# Patient Record
Sex: FEMALE | Race: WHITE | NOT HISPANIC OR LATINO | Employment: FULL TIME | URBAN - METROPOLITAN AREA
[De-identification: names, ages, dates, MRNs, and addresses within clinical notes are randomized per-mention and may not be internally consistent; named-entity substitution may affect disease eponyms.]

---

## 2023-12-18 ENCOUNTER — OFFICE VISIT (OUTPATIENT)
Dept: PRIMARY CARE | Facility: CLINIC | Age: 53
End: 2023-12-18
Payer: COMMERCIAL

## 2023-12-18 VITALS
BODY MASS INDEX: 28.84 KG/M2 | HEART RATE: 64 BPM | WEIGHT: 176 LBS | DIASTOLIC BLOOD PRESSURE: 78 MMHG | TEMPERATURE: 98.1 F | OXYGEN SATURATION: 96 % | SYSTOLIC BLOOD PRESSURE: 112 MMHG

## 2023-12-18 DIAGNOSIS — J01.40 ACUTE NON-RECURRENT PANSINUSITIS: Primary | ICD-10-CM

## 2023-12-18 DIAGNOSIS — J20.8 ACUTE BRONCHITIS DUE TO OTHER SPECIFIED ORGANISMS: ICD-10-CM

## 2023-12-18 PROCEDURE — 99213 OFFICE O/P EST LOW 20 MIN: CPT | Performed by: NURSE PRACTITIONER

## 2023-12-18 PROCEDURE — 1036F TOBACCO NON-USER: CPT | Performed by: NURSE PRACTITIONER

## 2023-12-18 RX ORDER — ALBUTEROL SULFATE 90 UG/1
2 AEROSOL, METERED RESPIRATORY (INHALATION) EVERY 6 HOURS PRN
Qty: 8 G | Refills: 5 | Status: SHIPPED | OUTPATIENT
Start: 2023-12-18 | End: 2024-12-17

## 2023-12-18 RX ORDER — ESCITALOPRAM OXALATE 10 MG/1
10 TABLET ORAL DAILY
COMMUNITY
Start: 2023-01-12

## 2023-12-18 RX ORDER — CETIRIZINE HYDROCHLORIDE 10 MG/1
10 TABLET ORAL DAILY
COMMUNITY
Start: 2018-09-11

## 2023-12-18 RX ORDER — ESOMEPRAZOLE MAGNESIUM 40 MG/1
40 CAPSULE, DELAYED RELEASE ORAL
COMMUNITY
End: 2024-03-01

## 2023-12-18 RX ORDER — AMOXICILLIN AND CLAVULANATE POTASSIUM 875; 125 MG/1; MG/1
875 TABLET, FILM COATED ORAL 2 TIMES DAILY
Qty: 20 TABLET | Refills: 0 | Status: SHIPPED | OUTPATIENT
Start: 2023-12-18 | End: 2024-01-04 | Stop reason: ALTCHOICE

## 2023-12-18 RX ORDER — BENZONATATE 200 MG/1
200 CAPSULE ORAL 3 TIMES DAILY PRN
Qty: 21 CAPSULE | Refills: 0 | Status: SHIPPED | OUTPATIENT
Start: 2023-12-18 | End: 2023-12-25

## 2023-12-18 ASSESSMENT — ENCOUNTER SYMPTOMS
SINUS PRESSURE: 1
SINUS PAIN: 1
DIARRHEA: 1
DIZZINESS: 0
FATIGUE: 1
COUGH: 1
VOMITING: 1
CHILLS: 1
RHINORRHEA: 0
SORE THROAT: 0
HEADACHES: 1
NAUSEA: 1
FEVER: 0

## 2023-12-18 NOTE — PROGRESS NOTES
Subjective   Patient ID: Halle Chen is a 53 y.o. female who presents for Cough (Congestion, fatigue, sinus pressure, and headache. X2 weeks.).    HPI   Halle is a 53-year-old female who complains of chest congestion, cough, sinus pressure and frontal headache that have been ongoing for at least 2 weeks.  See review of systems  No contacts ill  Using dayquil As needed  Had Covid 19 mid 11/23    Review of Systems   Constitutional:  Positive for chills and fatigue. Negative for fever.   HENT:  Positive for congestion, sinus pressure and sinus pain. Negative for ear pain, rhinorrhea, sneezing and sore throat.    Respiratory:  Positive for cough (occasional sputum).    Cardiovascular:  Negative for chest pain.   Gastrointestinal:  Positive for diarrhea, nausea and vomiting.        Sx this past weekend but now resolved   Neurological:  Positive for headaches. Negative for dizziness.       Objective   /78   Pulse 64   Temp 36.7 °C (98.1 °F)   Wt 79.8 kg (176 lb)   SpO2 96%   BMI 28.84 kg/m²     Physical Exam  Alert and oriented x 3, no acute distress  Bilateral TMs dull.  Nares red with edematous turbinates.  There is posterior pharynx erythema.  Maxillary and frontal sinus tenderness noted.  Neck supple without lymphadenopathy  Lungs coarse with harsh cough on forced expiration.  No wheezing or rales.  Speaks complete sentences.  Heart with regular rate and rhythm  Skin warm and dry without rash  Neuro grossly intact    Assessment/Plan   Diagnoses and all orders for this visit:  Acute non-recurrent pansinusitis  -     amoxicillin-pot clavulanate (Augmentin) 875-125 mg tablet; Take 1 tablet (875 mg) by mouth 2 times a day for 10 days.  -     albuterol (Ventolin HFA) 90 mcg/actuation inhaler; Inhale 2 puffs every 6 hours if needed for wheezing or shortness of breath (cough).  -     benzonatate (Tessalon) 200 mg capsule; Take 1 capsule (200 mg) by mouth 3 times a day as needed for cough for up to 7  days. Do not crush or chew.  Acute bronchitis due to other specified organisms  -     amoxicillin-pot clavulanate (Augmentin) 875-125 mg tablet; Take 1 tablet (875 mg) by mouth 2 times a day for 10 days.  -     albuterol (Ventolin HFA) 90 mcg/actuation inhaler; Inhale 2 puffs every 6 hours if needed for wheezing or shortness of breath (cough).  -     benzonatate (Tessalon) 200 mg capsule; Take 1 capsule (200 mg) by mouth 3 times a day as needed for cough for up to 7 days. Do not crush or chew.  Needs better control  Concern for secondary bacterial infection after recent COVID-19  Will cover with medications as prescribed  OTC as directed  Fluids, rest  Follow-up in 10 to 14 days if not improving, sooner if worse.  Would need chest x-ray if not improving

## 2024-01-03 ENCOUNTER — LAB (OUTPATIENT)
Dept: LAB | Facility: LAB | Age: 54
End: 2024-01-03
Payer: COMMERCIAL

## 2024-01-03 DIAGNOSIS — E78.00 PURE HYPERCHOLESTEROLEMIA, UNSPECIFIED: ICD-10-CM

## 2024-01-03 DIAGNOSIS — Z00.00 ENCOUNTER FOR GENERAL ADULT MEDICAL EXAMINATION WITHOUT ABNORMAL FINDINGS: Primary | ICD-10-CM

## 2024-01-03 LAB
ALBUMIN SERPL-MCNC: 4.6 G/DL (ref 3.5–5)
ALP BLD-CCNC: 125 U/L (ref 35–125)
ALT SERPL-CCNC: 51 U/L (ref 5–40)
ANION GAP SERPL CALC-SCNC: 12 MMOL/L
AST SERPL-CCNC: 38 U/L (ref 5–40)
BASOPHILS # BLD AUTO: 0.07 X10*3/UL (ref 0–0.1)
BASOPHILS NFR BLD AUTO: 1.3 %
BILIRUB SERPL-MCNC: 1.6 MG/DL (ref 0.1–1.2)
BUN SERPL-MCNC: 14 MG/DL (ref 8–25)
CALCIUM SERPL-MCNC: 9.3 MG/DL (ref 8.5–10.4)
CHLORIDE SERPL-SCNC: 102 MMOL/L (ref 97–107)
CHOLEST SERPL-MCNC: 233 MG/DL (ref 133–200)
CHOLEST/HDLC SERPL: 4.5 {RATIO}
CO2 SERPL-SCNC: 27 MMOL/L (ref 24–31)
CREAT SERPL-MCNC: 0.8 MG/DL (ref 0.4–1.6)
EOSINOPHIL # BLD AUTO: 0.37 X10*3/UL (ref 0–0.7)
EOSINOPHIL NFR BLD AUTO: 6.7 %
ERYTHROCYTE [DISTWIDTH] IN BLOOD BY AUTOMATED COUNT: 11.9 % (ref 11.5–14.5)
GFR SERPL CREATININE-BSD FRML MDRD: 88 ML/MIN/1.73M*2
GLUCOSE SERPL-MCNC: 97 MG/DL (ref 65–99)
HCT VFR BLD AUTO: 42.9 % (ref 36–46)
HDLC SERPL-MCNC: 52 MG/DL
HGB BLD-MCNC: 14.2 G/DL (ref 12–16)
IMM GRANULOCYTES # BLD AUTO: 0.02 X10*3/UL (ref 0–0.7)
IMM GRANULOCYTES NFR BLD AUTO: 0.4 % (ref 0–0.9)
LDLC SERPL CALC-MCNC: 127 MG/DL (ref 65–130)
LYMPHOCYTES # BLD AUTO: 1.46 X10*3/UL (ref 1.2–4.8)
LYMPHOCYTES NFR BLD AUTO: 26.3 %
MCH RBC QN AUTO: 32.1 PG (ref 26–34)
MCHC RBC AUTO-ENTMCNC: 33.1 G/DL (ref 32–36)
MCV RBC AUTO: 97 FL (ref 80–100)
MONOCYTES # BLD AUTO: 0.38 X10*3/UL (ref 0.1–1)
MONOCYTES NFR BLD AUTO: 6.8 %
NEUTROPHILS # BLD AUTO: 3.26 X10*3/UL (ref 1.2–7.7)
NEUTROPHILS NFR BLD AUTO: 58.5 %
NRBC BLD-RTO: 0 /100 WBCS (ref 0–0)
PLATELET # BLD AUTO: 196 X10*3/UL (ref 150–450)
POTASSIUM SERPL-SCNC: 4.7 MMOL/L (ref 3.4–5.1)
PROT SERPL-MCNC: 6.6 G/DL (ref 5.9–7.9)
RBC # BLD AUTO: 4.43 X10*6/UL (ref 4–5.2)
SODIUM SERPL-SCNC: 141 MMOL/L (ref 133–145)
TRIGL SERPL-MCNC: 272 MG/DL (ref 40–150)
WBC # BLD AUTO: 5.6 X10*3/UL (ref 4.4–11.3)

## 2024-01-03 PROCEDURE — 36415 COLL VENOUS BLD VENIPUNCTURE: CPT

## 2024-01-03 PROCEDURE — 80053 COMPREHEN METABOLIC PANEL: CPT

## 2024-01-03 PROCEDURE — 85025 COMPLETE CBC W/AUTO DIFF WBC: CPT

## 2024-01-03 PROCEDURE — 81003 URINALYSIS AUTO W/O SCOPE: CPT

## 2024-01-03 PROCEDURE — 80061 LIPID PANEL: CPT

## 2024-01-03 ASSESSMENT — PROMIS GLOBAL HEALTH SCALE
EMOTIONAL_PROBLEMS: RARELY
RATE_AVERAGE_PAIN: 0
RATE_SOCIAL_SATISFACTION: EXCELLENT
RATE_AVERAGE_FATIGUE: MILD
RATE_PHYSICAL_HEALTH: VERY GOOD
CARRYOUT_SOCIAL_ACTIVITIES: EXCELLENT
CARRYOUT_PHYSICAL_ACTIVITIES: COMPLETELY
RATE_QUALITY_OF_LIFE: EXCELLENT
RATE_GENERAL_HEALTH: VERY GOOD
RATE_MENTAL_HEALTH: VERY GOOD

## 2024-01-04 ENCOUNTER — OFFICE VISIT (OUTPATIENT)
Dept: PRIMARY CARE | Facility: CLINIC | Age: 54
End: 2024-01-04
Payer: COMMERCIAL

## 2024-01-04 ENCOUNTER — TELEPHONE (OUTPATIENT)
Dept: PRIMARY CARE | Facility: CLINIC | Age: 54
End: 2024-01-04

## 2024-01-04 VITALS
RESPIRATION RATE: 16 BRPM | BODY MASS INDEX: 27.48 KG/M2 | OXYGEN SATURATION: 95 % | WEIGHT: 171 LBS | HEART RATE: 71 BPM | SYSTOLIC BLOOD PRESSURE: 110 MMHG | HEIGHT: 66 IN | DIASTOLIC BLOOD PRESSURE: 82 MMHG

## 2024-01-04 DIAGNOSIS — Z00.00 WELL ADULT EXAM: ICD-10-CM

## 2024-01-04 DIAGNOSIS — K21.9 GASTROESOPHAGEAL REFLUX DISEASE WITHOUT ESOPHAGITIS: ICD-10-CM

## 2024-01-04 DIAGNOSIS — E78.00 PURE HYPERCHOLESTEROLEMIA: ICD-10-CM

## 2024-01-04 DIAGNOSIS — Z00.00 WELL ADULT EXAM: Primary | ICD-10-CM

## 2024-01-04 DIAGNOSIS — F41.9 ANXIETY DISORDER, UNSPECIFIED TYPE: ICD-10-CM

## 2024-01-04 LAB
APPEARANCE UR: ABNORMAL
BILIRUB UR STRIP.AUTO-MCNC: NEGATIVE MG/DL
COLOR UR: ABNORMAL
GLUCOSE UR STRIP.AUTO-MCNC: NORMAL MG/DL
KETONES UR STRIP.AUTO-MCNC: NEGATIVE MG/DL
LEUKOCYTE ESTERASE UR QL STRIP.AUTO: NEGATIVE
NITRITE UR QL STRIP.AUTO: NEGATIVE
PH UR STRIP.AUTO: 6 [PH]
PROT UR STRIP.AUTO-MCNC: NEGATIVE MG/DL
RBC # UR STRIP.AUTO: NEGATIVE /UL
SP GR UR STRIP.AUTO: 1.01
UROBILINOGEN UR STRIP.AUTO-MCNC: NORMAL MG/DL

## 2024-01-04 PROCEDURE — 1036F TOBACCO NON-USER: CPT | Performed by: FAMILY MEDICINE

## 2024-01-04 PROCEDURE — 99212 OFFICE O/P EST SF 10 MIN: CPT | Performed by: FAMILY MEDICINE

## 2024-01-04 PROCEDURE — 99396 PREV VISIT EST AGE 40-64: CPT | Performed by: FAMILY MEDICINE

## 2024-01-04 RX ORDER — IPRATROPIUM BROMIDE 42 UG/1
2 SPRAY, METERED NASAL 4 TIMES DAILY
COMMUNITY
Start: 2022-03-30

## 2024-01-04 RX ORDER — CHOLECALCIFEROL (VITAMIN D3) 50 MCG
2000 TABLET ORAL
COMMUNITY
Start: 2022-03-28

## 2024-01-04 ASSESSMENT — PATIENT HEALTH QUESTIONNAIRE - PHQ9
2. FEELING DOWN, DEPRESSED OR HOPELESS: NOT AT ALL
SUM OF ALL RESPONSES TO PHQ9 QUESTIONS 1 AND 2: 0
1. LITTLE INTEREST OR PLEASURE IN DOING THINGS: NOT AT ALL

## 2024-01-04 ASSESSMENT — ENCOUNTER SYMPTOMS
OCCASIONAL FEELINGS OF UNSTEADINESS: 0
DEPRESSION: 0
LOSS OF SENSATION IN FEET: 0

## 2024-01-04 ASSESSMENT — PAIN SCALES - GENERAL: PAINLEVEL: 0-NO PAIN

## 2024-01-04 NOTE — ASSESSMENT & PLAN NOTE
Will wean her off the Lexapro by taking half a pill per 5 mg every day for 1 month.  If she is doing well at that time she is to cut back to half a pill every other day.  She is to do this for a month and if all is well stop altogether.  She should call here for any problems.

## 2024-01-04 NOTE — PROGRESS NOTES
Subjective   Patient ID: Halle Chen is a 53 y.o. female who presents for Annual Exam.    HPI   1. HALLE is seen for for her comprehensive physical exam. PMH, PSH, family history and social history were reviewed and updated.  Colonoscopy in 2022 by Dr. Baker. Her father had colon CA and she has siblings with precancerous polyps and colitis.   GYN history -.  Sees a Gynecologist.     2. HALLE is seen also for follow up of anxiety.  She is on Lexapro 10 mg. She was followed by a psychiatrist but is no longer.  She is interested in getting off the Lexapro that she has been having no anxiety symptoms for many years.     3. HALLE is seen for also for follow up for GERD.  She is on Nexium daily which controls her SX.     4. She is here for hypercholesterolemia.  She is on no medication.  Recent LDL is 127.     5. She is also here for elevated LFTs which were noted on this year's CPE blood work.  She has Gilbert's  disease.    Review of Systems  Constitutional symptoms: No fever, loss of appetite, headaches, fatigue.  Eyes: Negative for vision loss or blurred or double vision.  ENT: Negative for hearing loss, tinnitus, nasal congestion, rhinorrhea, nosebleeds, teeth problems, mouth sores, sore throat or dysphagia.  Cardiovascular: Negative for chest pain/pressure, palpitations, edema, claudication.  Respiratory: Negative for shortness of breath, dyspnea on exertion, pain with breathing or coughing.  Breast: Negative for tenderness, masses, gynecomastia or discharge.  Gastrointestinal: Negative for anorexia, nausea, vomiting, indigestion, pain, change in bowel habits or blood in stool.  Musculoskeletal: Negative for joint pain, joint swelling, myalgias or cramps.  Skin: Negative for rash, changing skin lesions.  Neurological: Negative for headache, numbness, tingling, weakness or tremors.  Psychiatric: Negative for depression or anxiety.  Endocrine: Negative for marked change in weight, heat or  "cold intolerance, polyuria, polydipsia or polyphagia.  Hematologic: Negative for bruising or abnormal bleeding.    Objective   /82   Pulse 71   Resp 16   Ht 1.676 m (5' 6\")   Wt 77.6 kg (171 lb)   SpO2 95%   BMI 27.60 kg/m²     Physical Exam  General Appearance: ALVARO is in no acute distress. She is well nourished, well developed. The patient is awake and alert and appears stated age. ALVARO is cooperative with exam.  Head:   ALVARO's hair pattern is normal for patients age and The scalp is normal . The skull is normocephalic, atraumatic. The face is unremarkable with no facial droop.  Eyes: PERRLA, EOMI, no scleral icterus. Ophthalmoscopic exam of shows normal cup to disk ratio reveals normal light reflex.  Ears, Nose, Mouth, Throat: Both canals are normal.  Tympanic membranes are pearly gray, normal landmarks, good light reflex.   NOSE: Nasal mucosa is normal with no polyps, ulcers or lesions in Septum has no deviation. Tonsils are normal with no lesion. Uvula is normal. Posterior pharynx without lesions.  Neck: Inspection of the neck reveals no enlarged nodes, no masses, no JVD, euthyroid and symmetric, trachea is midline, no mass or JVD . Palpation shows normal pulsation, no adenopathy, or mass.   Carotid auscultation reveals No murmur appreciated with No carotid bruit present.  Chest: Lungs are clear to auscultation and percussion, no respiratory distress.  Cardiovascular: Heart is regularr in rhythm. No murmurs or gallops DP pulses are present. PT pulses are present Extremities: Trace edema of lower extremities.  No calf tenderness. Negative Jorge's sign.  Abdomen: Bowel sounds normal, non-tender no mass or organomeglia, no bruit. Auscultation: No tenderness, hepatosplenomegaly, or mass detection.. Light palpation reveals no tenderness, hepatosplenomegaly or mass detection..  Genitourinary: Genital/Rectal exam is deferred.  Musculoskeletal: Extremities: No deformity. No cyanosis, clubbing or " edema. Right lower extremity is normal on inspection. Right hip has no deformity. Palpation reveals no tenderness and Hip range of motion is full ROM without pain .  Both feet have no evidence of lesions and nails are in good repair.  Neurological: Awake, alert and oriented X3. Cranial nerves II - XII are grossly intact.  Assessment/Plan   Problem List Items Addressed This Visit             ICD-10-CM       High    Anxiety disorder F41.9     Will wean her off the Lexapro by taking half a pill per 5 mg every day for 1 month.  If she is doing well at that time she is to cut back to half a pill every other day.  She is to do this for a month and if all is well stop altogether.  She should call here for any problems.         Gastroesophageal reflux disease without esophagitis K21.9     Continue Nexium.         Pure hypercholesterolemia E78.00     Keep off medication.  Improve low-fat diet.  Increase exercise.  Will check again in 1 year at CPE.         Well adult exam - Primary Z00.00     Anticipatory guidance given.

## 2024-01-04 NOTE — ASSESSMENT & PLAN NOTE
Keep off medication.  Improve low-fat diet.  Increase exercise.  Will check again in 1 year at CPE.

## 2024-03-01 DIAGNOSIS — K21.9 GASTROESOPHAGEAL REFLUX DISEASE WITHOUT ESOPHAGITIS: ICD-10-CM

## 2024-03-01 RX ORDER — ESOMEPRAZOLE MAGNESIUM 40 MG/1
40 CAPSULE, DELAYED RELEASE ORAL DAILY
Qty: 90 CAPSULE | Refills: 1 | Status: SHIPPED | OUTPATIENT
Start: 2024-03-01

## 2024-04-05 ENCOUNTER — OFFICE VISIT (OUTPATIENT)
Dept: PRIMARY CARE | Facility: CLINIC | Age: 54
End: 2024-04-05
Payer: COMMERCIAL

## 2024-04-05 VITALS
SYSTOLIC BLOOD PRESSURE: 122 MMHG | DIASTOLIC BLOOD PRESSURE: 84 MMHG | BODY MASS INDEX: 27.76 KG/M2 | HEART RATE: 54 BPM | TEMPERATURE: 97.1 F | OXYGEN SATURATION: 97 % | WEIGHT: 172 LBS | RESPIRATION RATE: 16 BRPM

## 2024-04-05 DIAGNOSIS — S06.0X1A CONCUSSION WITH LOSS OF CONSCIOUSNESS OF 30 MINUTES OR LESS, INITIAL ENCOUNTER: Primary | ICD-10-CM

## 2024-04-05 PROCEDURE — 99214 OFFICE O/P EST MOD 30 MIN: CPT | Performed by: FAMILY MEDICINE

## 2024-04-05 PROCEDURE — 1036F TOBACCO NON-USER: CPT | Performed by: FAMILY MEDICINE

## 2024-04-05 ASSESSMENT — ENCOUNTER SYMPTOMS
LOSS OF SENSATION IN FEET: 0
OCCASIONAL FEELINGS OF UNSTEADINESS: 0
DEPRESSION: 0

## 2024-04-05 ASSESSMENT — PAIN SCALES - GENERAL: PAINLEVEL: 5

## 2024-04-05 NOTE — PROGRESS NOTES
Subjective   Patient ID: Halle Chen is a 53 y.o. female who presents for Head Injury (Black eye x 6 days).    HPI   She is here for head injury and possible concussion.  6 days ago she was sick with a stomach virus and was vomiting.  This went through the household.  At 1 point she felt sick and was going into the bathroom and started vomiting but she passed out.  When she woke up she had vomited on the floor and she had hit her right forehead as there was a large bump there and she had a headache.  She does not know if she passed out complete before she hit her head or she if she passed out because she hit her head.  She had a lot of bruising on top of the eye and then below the eye and subsequent days.  She started feeling a little foggy and has had a persistent headache so she came in to be assessed for concussion.  Her vomiting has resolved.  She denies any double vision no blurred vision.  The swelling above her eye resolved but now she is left with a bruise below her right eye. She has been eating fine and sleeping without a problem.  If she exerts herself she gets a little more of a headache.    Review of Systems  Denies blurred vision, chest pain, shortness of breath, nausea or vomiting, change in bowel habits or leg pain or swelling.    Objective   /84 (BP Location: Left arm, Patient Position: Sitting, BP Cuff Size: Large adult)   Pulse 54   Temp 36.2 °C (97.1 °F)   Resp 16   Wt 78 kg (172 lb)   LMP  (LMP Unknown)   SpO2 97%   BMI 27.76 kg/m²     Physical Exam  Vitals and nurse's notes reviewed  General: no acute distress  HEENT:Ecchymotic area below her right eye with some swelling.  There is some tenderness on the frontal bone above the right eye and some tenderness although mild on the zygomatic arch where the bruising is below her right eye.  EOMI x 2.  On palpating around the eye I feel no step-off or crepitus either above or below the eye.  Neck: Supple  Lungs: Clear  Cardio:  RRR w/o murmur  Abdomen: Soft, nontender, no hepatosplenomegaly  Extremities: No edema, no calf tenderness  Neuro: Alert and oriented with no focal deficits      Assessment/Plan   Problem List Items Addressed This Visit             ICD-10-CM       Medium    Concussion with loss of consciousness of 30 minutes or less - Primary S06.0X1A     It is unclear whether she passed out because of the concussion or prior to the concussion.  At any rate she has  symptoms that are consistent with concussion.  Overall her symptoms are improving.  There is no evidence of fracture on her examination and since her symptoms are improving and she has a normal exam we will hold off on any imaging.  I recommend she not be in any position where she could fall and injure her head at least for the next week or so.  I told her if she were to exercise she should do it at a very limited basis and modify her workouts so that there is no threat to  hitting her head.  If she has any worsening headache while she exercises she should stop.  She can use ibuprofen for pain.  Heat 20 min.  per application to the bruising.  If her symptoms should get worse or she develops any double vision or vomiting or worsening headache she should call back.

## 2024-04-05 NOTE — ASSESSMENT & PLAN NOTE
It is unclear whether she passed out because of the concussion or prior to the concussion.  At any rate she has  symptoms that are consistent with concussion.  Overall her symptoms are improving.  There is no evidence of fracture on her examination and since her symptoms are improving and she has a normal exam we will hold off on any imaging.  I recommend she not be in any position where she could fall and injure her head at least for the next week or so.  I told her if she were to exercise she should do it at a very limited basis and modify her workouts so that there is no threat to  hitting her head.  If she has any worsening headache while she exercises she should stop.  She can use ibuprofen for pain.  Heat 20 min.  per application to the bruising.  If her symptoms should get worse or she develops any double vision or vomiting or worsening headache she should call back.

## 2024-07-16 DIAGNOSIS — F41.9 ANXIETY DISORDER, UNSPECIFIED TYPE: Primary | ICD-10-CM

## 2024-07-16 RX ORDER — ALPRAZOLAM 0.25 MG/1
0.25 TABLET ORAL DAILY PRN
Qty: 10 TABLET | Refills: 0 | Status: SHIPPED | OUTPATIENT
Start: 2024-07-16 | End: 2024-07-26

## 2024-08-12 ENCOUNTER — OFFICE VISIT (OUTPATIENT)
Dept: PRIMARY CARE | Facility: CLINIC | Age: 54
End: 2024-08-12
Payer: COMMERCIAL

## 2024-08-12 VITALS
WEIGHT: 161 LBS | DIASTOLIC BLOOD PRESSURE: 74 MMHG | OXYGEN SATURATION: 96 % | RESPIRATION RATE: 16 BRPM | BODY MASS INDEX: 25.99 KG/M2 | SYSTOLIC BLOOD PRESSURE: 112 MMHG | HEART RATE: 64 BPM | TEMPERATURE: 98.1 F

## 2024-08-12 DIAGNOSIS — F41.9 ANXIETY DISORDER, UNSPECIFIED TYPE: ICD-10-CM

## 2024-08-12 DIAGNOSIS — R19.7 DIARRHEA OF PRESUMED INFECTIOUS ORIGIN: Primary | ICD-10-CM

## 2024-08-12 PROCEDURE — 99214 OFFICE O/P EST MOD 30 MIN: CPT | Performed by: FAMILY MEDICINE

## 2024-08-12 PROCEDURE — 1036F TOBACCO NON-USER: CPT | Performed by: FAMILY MEDICINE

## 2024-08-12 RX ORDER — ESCITALOPRAM OXALATE 10 MG/1
10 TABLET ORAL DAILY
Qty: 90 TABLET | Refills: 3 | Status: SHIPPED | OUTPATIENT
Start: 2024-08-12 | End: 2025-08-12

## 2024-08-12 ASSESSMENT — PATIENT HEALTH QUESTIONNAIRE - PHQ9
SUM OF ALL RESPONSES TO PHQ9 QUESTIONS 1 AND 2: 0
2. FEELING DOWN, DEPRESSED OR HOPELESS: NOT AT ALL
1. LITTLE INTEREST OR PLEASURE IN DOING THINGS: NOT AT ALL

## 2024-08-12 ASSESSMENT — ENCOUNTER SYMPTOMS
LOSS OF SENSATION IN FEET: 0
OCCASIONAL FEELINGS OF UNSTEADINESS: 0
DEPRESSION: 0

## 2024-08-12 ASSESSMENT — PAIN SCALES - GENERAL: PAINLEVEL: 0-NO PAIN

## 2024-08-12 NOTE — PROGRESS NOTES
Subjective   Patient ID: Halle Chen is a 54 y.o. female who presents for Abdominal Pain (Patient is here for stomach pain after she eats x 2 weeks).    HPI   She is here for abdominal discomfort and diarrhea for the past 2 weeks.  She is traveling in [place] in a day or so before she came home she developed a fever.  She started getting abdominal cramping and when she arrived home she started having episodes of diarrhea and reflux.  Her fever resolved but now every time she eats something she has to run to the bathroom has diarrhea 3-4 times.  She also feels like she is about to vomit but never does.  She can drink water without problems.  Her symptoms have persisted for the past 2 weeks.  When she takes Imodium it does slow down the diarrhea and the cramping.  She has a history of GERD and does take Nexium 40 daily which she has been taking.  She had a normal colonoscopy by Dr. Baker 2 years ago. She states her son also developed a fever on the trip but never had diarrhea he was, however, diagnosed with what sounds a good GI parasite prior to his trip to [place].  He is being treated for that now.    Review of Systems  Denies headache, blurred vision, chest pain, shortness of breath, or leg pain or swelling.    Objective   /74 (BP Location: Left arm, Patient Position: Sitting, BP Cuff Size: Large adult)   Pulse 64   Temp 36.7 °C (98.1 °F)   Resp 16   Wt 73 kg (161 lb)   LMP  (LMP Unknown)   SpO2 96%   BMI 25.99 kg/m²     Physical Exam  Vitals and nurse's notes reviewed  General: no acute distress  HEENT: Normal  Neck: Supple  Lungs: Clear  Cardio: RRR w/o murmur  Abdomen: Soft, Minimal epigastric tenderness, no hepatosplenomegaly  Extremities: No edema, no calf tenderness  Neuro: Alert and oriented with no focal deficits    Assessment/Plan   Problem List Items Addressed This Visit             ICD-10-CM       High    Anxiety disorder F41.9    Relevant Medications    escitalopram  (Lexapro) 10 mg tablet       Medium    Diarrhea of presumed infectious origin - Primary R19.7     Will get stool cultures.  Once he is are obtained she can go back to using Imodium on appearing basis.  She is increase fluids but avoid dairy products.  She is to take her Nexium twice daily for the next week.  I will notify her of culture results.  She is let me know if symptoms change.         Relevant Orders    Ova/Para + Giardia/Cryptosporidium Antigen    Rotavirus antigen, stool    Stool Pathogen Panel, PCR    C. difficile, PCR

## 2024-08-12 NOTE — ASSESSMENT & PLAN NOTE
Will get stool cultures.  Once he is are obtained she can go back to using Imodium on appearing basis.  She is increase fluids but avoid dairy products.  She is to take her Nexium twice daily for the next week.  I will notify her of culture results.  She is let me know if symptoms change.

## 2024-08-14 ENCOUNTER — LAB (OUTPATIENT)
Dept: LAB | Facility: LAB | Age: 54
End: 2024-08-14
Payer: COMMERCIAL

## 2024-08-14 DIAGNOSIS — R19.7 DIARRHEA OF PRESUMED INFECTIOUS ORIGIN: ICD-10-CM

## 2024-08-14 LAB — C DIF TOX TCDA+TCDB STL QL NAA+PROBE: NOT DETECTED

## 2024-08-14 PROCEDURE — 87493 C DIFF AMPLIFIED PROBE: CPT

## 2024-08-14 PROCEDURE — 87506 IADNA-DNA/RNA PROBE TQ 6-11: CPT

## 2024-08-14 PROCEDURE — 87425 ROTAVIRUS AG IA: CPT

## 2024-08-15 ENCOUNTER — TELEPHONE (OUTPATIENT)
Dept: PRIMARY CARE | Facility: CLINIC | Age: 54
End: 2024-08-15
Payer: COMMERCIAL

## 2024-08-15 DIAGNOSIS — R19.7 DIARRHEA OF PRESUMED INFECTIOUS ORIGIN: Primary | ICD-10-CM

## 2024-08-15 LAB
C COLI+JEJ+UPSA DNA STL QL NAA+PROBE: DETECTED
EC STX1 GENE STL QL NAA+PROBE: NOT DETECTED
EC STX2 GENE STL QL NAA+PROBE: NOT DETECTED
NOROVIRUS GI + GII RNA STL NAA+PROBE: NOT DETECTED
RV RNA STL NAA+PROBE: NOT DETECTED
SALMONELLA DNA STL QL NAA+PROBE: NOT DETECTED
SHIGELLA DNA SPEC QL NAA+PROBE: NOT DETECTED
V CHOLERAE DNA STL QL NAA+PROBE: NOT DETECTED
Y ENTEROCOL DNA STL QL NAA+PROBE: NOT DETECTED

## 2024-08-15 RX ORDER — CIPROFLOXACIN 750 MG/1
750 TABLET, FILM COATED ORAL 2 TIMES DAILY
Qty: 6 TABLET | Refills: 0 | Status: SHIPPED | OUTPATIENT
Start: 2024-08-15 | End: 2024-08-18

## 2024-08-15 NOTE — TELEPHONE ENCOUNTER
I spoke with the patient by phone regarding her positive Campylobacter.  Because been going on for a prolonged period time we will treat with Cipro 750 twice daily for 3 days.  Will avoid azithromycin since she is on escitalopram.  If symptoms persist after medications finished she is to let me know.

## 2024-08-16 LAB — RV AG STL QL IA: NEGATIVE

## 2024-08-21 DIAGNOSIS — K21.9 GASTROESOPHAGEAL REFLUX DISEASE WITHOUT ESOPHAGITIS: Primary | ICD-10-CM

## 2024-08-21 RX ORDER — ESOMEPRAZOLE MAGNESIUM 40 MG/1
40 CAPSULE, DELAYED RELEASE ORAL
Qty: 30 CAPSULE | Refills: 0 | Status: SHIPPED | OUTPATIENT
Start: 2024-08-21 | End: 2024-09-20

## 2024-08-28 ENCOUNTER — PATIENT MESSAGE (OUTPATIENT)
Dept: PRIMARY CARE | Facility: CLINIC | Age: 54
End: 2024-08-28
Payer: COMMERCIAL

## 2024-08-28 DIAGNOSIS — K21.9 GASTROESOPHAGEAL REFLUX DISEASE WITHOUT ESOPHAGITIS: ICD-10-CM

## 2024-08-29 RX ORDER — ESOMEPRAZOLE MAGNESIUM 40 MG/1
40 CAPSULE, DELAYED RELEASE ORAL
Qty: 90 CAPSULE | Refills: 0 | Status: SHIPPED | OUTPATIENT
Start: 2024-08-29

## 2024-12-03 ENCOUNTER — PATIENT MESSAGE (OUTPATIENT)
Dept: PRIMARY CARE | Facility: CLINIC | Age: 54
End: 2024-12-03
Payer: COMMERCIAL

## 2024-12-03 DIAGNOSIS — F41.9 ANXIETY DISORDER, UNSPECIFIED TYPE: ICD-10-CM

## 2024-12-04 RX ORDER — ALPRAZOLAM 0.25 MG/1
0.25 TABLET ORAL DAILY PRN
Qty: 10 TABLET | Refills: 0 | Status: SHIPPED | OUTPATIENT
Start: 2024-12-04 | End: 2024-12-14

## 2025-01-06 ENCOUNTER — APPOINTMENT (OUTPATIENT)
Dept: PRIMARY CARE | Facility: CLINIC | Age: 55
End: 2025-01-06
Payer: COMMERCIAL

## 2025-05-25 DIAGNOSIS — F41.9 ANXIETY DISORDER, UNSPECIFIED TYPE: ICD-10-CM

## 2025-05-25 RX ORDER — ALPRAZOLAM 0.25 MG/1
0.25 TABLET ORAL DAILY PRN
Qty: 10 TABLET | Refills: 0 | Status: SHIPPED | OUTPATIENT
Start: 2025-05-25 | End: 2025-06-04

## 2025-08-24 DIAGNOSIS — F41.9 ANXIETY DISORDER, UNSPECIFIED TYPE: ICD-10-CM

## 2025-08-25 RX ORDER — ESCITALOPRAM OXALATE 10 MG/1
10 TABLET ORAL DAILY
Qty: 90 TABLET | Refills: 3 | Status: SHIPPED | OUTPATIENT
Start: 2025-08-25